# Patient Record
Sex: MALE | Race: WHITE | NOT HISPANIC OR LATINO | Employment: UNEMPLOYED | URBAN - NONMETROPOLITAN AREA
[De-identification: names, ages, dates, MRNs, and addresses within clinical notes are randomized per-mention and may not be internally consistent; named-entity substitution may affect disease eponyms.]

---

## 2017-01-04 ENCOUNTER — HOSPITAL ENCOUNTER (OUTPATIENT)
Dept: PHYSICAL THERAPY | Facility: HOSPITAL | Age: 47
Setting detail: THERAPIES SERIES
End: 2017-01-04
Attending: FAMILY MEDICINE
Payer: OTHER MISCELLANEOUS

## 2017-01-04 PROCEDURE — 40000718 ZZHC STATISTIC PT DEPARTMENT ORTHO VISIT: Performed by: PHYSICAL THERAPIST

## 2017-01-04 PROCEDURE — 97112 NEUROMUSCULAR REEDUCATION: CPT | Mod: GP | Performed by: PHYSICAL THERAPIST

## 2017-01-04 PROCEDURE — 97110 THERAPEUTIC EXERCISES: CPT | Mod: GP | Performed by: PHYSICAL THERAPIST

## 2017-01-06 ENCOUNTER — HOSPITAL ENCOUNTER (OUTPATIENT)
Dept: PHYSICAL THERAPY | Facility: HOSPITAL | Age: 47
Setting detail: THERAPIES SERIES
End: 2017-01-06
Attending: FAMILY MEDICINE
Payer: OTHER MISCELLANEOUS

## 2017-01-06 PROCEDURE — 97110 THERAPEUTIC EXERCISES: CPT | Mod: GP | Performed by: PHYSICAL THERAPIST

## 2017-01-06 PROCEDURE — 97112 NEUROMUSCULAR REEDUCATION: CPT | Mod: GP | Performed by: PHYSICAL THERAPIST

## 2017-01-06 PROCEDURE — 40000718 ZZHC STATISTIC PT DEPARTMENT ORTHO VISIT: Performed by: PHYSICAL THERAPIST

## 2017-01-09 ENCOUNTER — OFFICE VISIT (OUTPATIENT)
Dept: FAMILY MEDICINE | Facility: OTHER | Age: 47
End: 2017-01-09
Attending: FAMILY MEDICINE
Payer: OTHER MISCELLANEOUS

## 2017-01-09 ENCOUNTER — HOSPITAL ENCOUNTER (OUTPATIENT)
Dept: PHYSICAL THERAPY | Facility: HOSPITAL | Age: 47
Setting detail: THERAPIES SERIES
End: 2017-01-09
Attending: FAMILY MEDICINE
Payer: OTHER MISCELLANEOUS

## 2017-01-09 VITALS
SYSTOLIC BLOOD PRESSURE: 122 MMHG | BODY MASS INDEX: 36.06 KG/M2 | WEIGHT: 281 LBS | RESPIRATION RATE: 17 BRPM | HEART RATE: 68 BPM | DIASTOLIC BLOOD PRESSURE: 72 MMHG | TEMPERATURE: 96 F

## 2017-01-09 DIAGNOSIS — G89.4 CHRONIC PAIN SYNDROME: ICD-10-CM

## 2017-01-09 DIAGNOSIS — M51.26 DISPLACEMENT OF LUMBAR INTERVERTEBRAL DISC WITHOUT MYELOPATHY: Primary | ICD-10-CM

## 2017-01-09 PROCEDURE — 99213 OFFICE O/P EST LOW 20 MIN: CPT | Performed by: FAMILY MEDICINE

## 2017-01-09 PROCEDURE — 97110 THERAPEUTIC EXERCISES: CPT | Mod: GP | Performed by: PHYSICAL THERAPIST

## 2017-01-09 PROCEDURE — 40000718 ZZHC STATISTIC PT DEPARTMENT ORTHO VISIT: Performed by: PHYSICAL THERAPIST

## 2017-01-09 ASSESSMENT — PAIN SCALES - GENERAL: PAINLEVEL: MODERATE PAIN (4)

## 2017-01-09 NOTE — PROGRESS NOTES
Outpatient Physical Therapy Progress Note     Patient: Valeriy Webb Jr  : 1970    Beginning/End Dates of Reporting Period:  16 to 2017    Referring Provider: Dr. Basurto    Therapy Diagnosis: chronic back pain     Client Self Report: Pt seen from 10:05-11:00.  Pt states he had increased tightness and pain in low back after last sessions, feels it's related to foam roller stretch but states it has improved.  Pt states he slept better last night and states that he did work on relaxation breathing prior to going to sleep last night.  Pt reports he has continued to do more activities for himself as he understands more than he will not hurt himself further with certain activities.    Objective Measurements:      Outcome Measures (most recent score):      Goals:  Goal Identifier LTG 1   Goal Description Sleep more than 4 hours per night.   Target Date 17   Date Met      Progress:     Goal Identifier LTG 2   Goal Description Able to tolerate community distance amb with pain < 3/10.   Target Date 17   Date Met      Progress:     Goal Identifier LTG 3   Goal Description Complete ADL's and household activity with back pain < 3/10.   Target Date 17   Date Met      Progress:     Goal Identifier     Goal Description     Target Date     Date Met      Progress:     Goal Identifier     Goal Description     Target Date     Date Met      Progress:     Goal Identifier     Goal Description     Target Date     Date Met      Progress:     Goal Identifier     Goal Description     Target Date     Date Met      Progress:     Goal Identifier     Goal Description     Target Date     Date Met      Progress:     Progress Toward Goals:   Progress this reporting period: Pt is demonstrating improvements in activity tolerance and aerobic activity participation, tolerating stationary bike x20 minutes.  Pt doing well with HEP of upper body theraband exercises to improve aerobic activity as well.  Pt has been  provided with education sessions regarding chronic pain and reports improvement with this education stating that he is doing more activities for himself that he would have formerly asked family to do because he was fearful of moving.  Pt has had some mild improvement in sleeping as well as of today's session.  Pt does continue to demonstrates overall limited activity tolerance, tightness in thoracic and lumbar spine, tightness in LEs, poor awareness of core and spinal musculature.  Pt would benefit from continued PT sessions to continue with chronic pain education as well as continue to progress aerobic activity, stretching, core stabilization, and manual therapy as needed.          Plan:  Continue therapy per current plan of care.  Would like to continue PT from 1/9/2017 to 3/6/2017 for an additional 16 sessions.      Discharge:  No    I certify the need for these services furnished under this plan of treatment and while under my care. (Physician co-signature of this document indicates review and certification of the therapy plan).
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
## 2017-01-09 NOTE — NURSING NOTE
"Chief Complaint   Patient presents with     Work Comp       Initial /72 mmHg  Pulse 68  Temp(Src) 96  F (35.6  C)  Resp 17  Wt 281 lb (127.461 kg) Estimated body mass index is 36.06 kg/(m^2) as calculated from the following:    Height as of 5/5/16: 6' 2\" (1.88 m).    Weight as of this encounter: 281 lb (127.461 kg).  BP completed using cuff size: large  "

## 2017-01-18 ENCOUNTER — HOSPITAL ENCOUNTER (OUTPATIENT)
Dept: PHYSICAL THERAPY | Facility: HOSPITAL | Age: 47
Setting detail: THERAPIES SERIES
End: 2017-01-18
Attending: FAMILY MEDICINE
Payer: OTHER MISCELLANEOUS

## 2017-01-18 PROCEDURE — 97110 THERAPEUTIC EXERCISES: CPT | Mod: GP | Performed by: PHYSICAL THERAPIST

## 2017-01-18 PROCEDURE — 40000718 ZZHC STATISTIC PT DEPARTMENT ORTHO VISIT: Performed by: PHYSICAL THERAPIST

## 2017-01-25 ENCOUNTER — HOSPITAL ENCOUNTER (OUTPATIENT)
Dept: PHYSICAL THERAPY | Facility: HOSPITAL | Age: 47
Setting detail: THERAPIES SERIES
End: 2017-01-25
Attending: FAMILY MEDICINE
Payer: OTHER MISCELLANEOUS

## 2017-01-25 PROCEDURE — 97110 THERAPEUTIC EXERCISES: CPT | Mod: GP | Performed by: PHYSICAL THERAPIST

## 2017-01-25 PROCEDURE — 40000718 ZZHC STATISTIC PT DEPARTMENT ORTHO VISIT: Performed by: PHYSICAL THERAPIST

## 2017-02-15 ENCOUNTER — HOSPITAL ENCOUNTER (OUTPATIENT)
Dept: PHYSICAL THERAPY | Facility: HOSPITAL | Age: 47
Setting detail: THERAPIES SERIES
End: 2017-02-15
Attending: FAMILY MEDICINE
Payer: OTHER MISCELLANEOUS

## 2017-02-15 PROCEDURE — 40000718 ZZHC STATISTIC PT DEPARTMENT ORTHO VISIT: Performed by: PHYSICAL THERAPIST

## 2017-02-15 PROCEDURE — 97110 THERAPEUTIC EXERCISES: CPT | Mod: GP | Performed by: PHYSICAL THERAPIST

## 2017-02-22 ENCOUNTER — HOSPITAL ENCOUNTER (OUTPATIENT)
Dept: PHYSICAL THERAPY | Facility: HOSPITAL | Age: 47
Setting detail: THERAPIES SERIES
End: 2017-02-22
Attending: FAMILY MEDICINE
Payer: OTHER MISCELLANEOUS

## 2017-02-22 PROCEDURE — 97110 THERAPEUTIC EXERCISES: CPT | Mod: GP | Performed by: PHYSICAL THERAPIST

## 2017-02-22 PROCEDURE — 40000718 ZZHC STATISTIC PT DEPARTMENT ORTHO VISIT: Performed by: PHYSICAL THERAPIST

## 2017-03-01 ENCOUNTER — HOSPITAL ENCOUNTER (OUTPATIENT)
Dept: PHYSICAL THERAPY | Facility: HOSPITAL | Age: 47
Setting detail: THERAPIES SERIES
End: 2017-03-01
Attending: FAMILY MEDICINE
Payer: OTHER MISCELLANEOUS

## 2017-03-01 PROCEDURE — 97110 THERAPEUTIC EXERCISES: CPT | Mod: GP | Performed by: PHYSICAL THERAPIST

## 2017-03-01 PROCEDURE — 40000718 ZZHC STATISTIC PT DEPARTMENT ORTHO VISIT: Performed by: PHYSICAL THERAPIST

## 2017-03-06 ENCOUNTER — TELEPHONE (OUTPATIENT)
Dept: FAMILY MEDICINE | Facility: OTHER | Age: 47
End: 2017-03-06

## 2017-03-06 DIAGNOSIS — M51.26 DISPLACEMENT OF LUMBAR INTERVERTEBRAL DISC WITHOUT MYELOPATHY: Primary | ICD-10-CM
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
## 2017-03-15 ENCOUNTER — HOSPITAL ENCOUNTER (OUTPATIENT)
Dept: PHYSICAL THERAPY | Facility: HOSPITAL | Age: 47
Setting detail: THERAPIES SERIES
End: 2017-03-15
Attending: FAMILY MEDICINE
Payer: OTHER MISCELLANEOUS

## 2017-03-15 PROCEDURE — 40000718 ZZHC STATISTIC PT DEPARTMENT ORTHO VISIT: Performed by: PHYSICAL THERAPIST

## 2017-03-15 PROCEDURE — 97110 THERAPEUTIC EXERCISES: CPT | Mod: GP | Performed by: PHYSICAL THERAPIST

## 2017-03-22 ENCOUNTER — HOSPITAL ENCOUNTER (OUTPATIENT)
Dept: PHYSICAL THERAPY | Facility: HOSPITAL | Age: 47
Setting detail: THERAPIES SERIES
End: 2017-03-22
Attending: FAMILY MEDICINE
Payer: OTHER MISCELLANEOUS

## 2017-03-22 PROCEDURE — 40000718 ZZHC STATISTIC PT DEPARTMENT ORTHO VISIT: Performed by: PHYSICAL THERAPIST

## 2017-03-22 PROCEDURE — 97110 THERAPEUTIC EXERCISES: CPT | Mod: GP | Performed by: PHYSICAL THERAPIST

## 2017-03-29 ENCOUNTER — HOSPITAL ENCOUNTER (OUTPATIENT)
Dept: PHYSICAL THERAPY | Facility: HOSPITAL | Age: 47
Setting detail: THERAPIES SERIES
End: 2017-03-29
Attending: FAMILY MEDICINE
Payer: OTHER MISCELLANEOUS

## 2017-03-29 PROCEDURE — 40000718 ZZHC STATISTIC PT DEPARTMENT ORTHO VISIT: Performed by: PHYSICAL THERAPIST

## 2017-03-29 PROCEDURE — 97110 THERAPEUTIC EXERCISES: CPT | Mod: GP | Performed by: PHYSICAL THERAPIST

## 2017-04-05 ENCOUNTER — HOSPITAL ENCOUNTER (OUTPATIENT)
Dept: PHYSICAL THERAPY | Facility: HOSPITAL | Age: 47
Setting detail: THERAPIES SERIES
End: 2017-04-05
Attending: FAMILY MEDICINE
Payer: OTHER MISCELLANEOUS

## 2017-04-05 PROCEDURE — 97110 THERAPEUTIC EXERCISES: CPT | Mod: GP | Performed by: PHYSICAL THERAPIST

## 2017-04-05 PROCEDURE — 40000718 ZZHC STATISTIC PT DEPARTMENT ORTHO VISIT: Performed by: PHYSICAL THERAPIST

## 2017-04-11 ENCOUNTER — HOSPITAL ENCOUNTER (OUTPATIENT)
Dept: PHYSICAL THERAPY | Facility: HOSPITAL | Age: 47
Setting detail: THERAPIES SERIES
End: 2017-04-11
Attending: FAMILY MEDICINE
Payer: OTHER MISCELLANEOUS

## 2017-04-11 PROCEDURE — 97110 THERAPEUTIC EXERCISES: CPT | Mod: GP | Performed by: PHYSICAL THERAPIST

## 2017-04-11 PROCEDURE — 40000718 ZZHC STATISTIC PT DEPARTMENT ORTHO VISIT: Performed by: PHYSICAL THERAPIST

## 2017-04-12 NOTE — PROGRESS NOTES
Outpatient Physical Therapy Discharge Note     Patient: Valeriy Webb Jr  : 1970    Beginning/End Dates of Reporting Period:  2016 to 2017    Referring Provider: Dr. Basurto    Therapy Diagnosis: chronic back pain     Client Self Report: Pt reports things have been pretty good since last visit.  Continues to work on HEP and walk both on treadmill and outside.  Pt looking forward to getting started at gym.    Objective Measurements:      Outcome Measures (most recent score):      Goals:  Goal Identifier LTG 1   Goal Description Sleep more than 4 hours per night.   Target Date 17   Date Met  17   Progress:     Goal Identifier LTG 2   Goal Description Able to tolerate community distance amb with pain < 3/10.   Target Date 17   Date Met  17   Progress:     Goal Identifier LTG 3   Goal Description Complete ADL's and household activity with back pain < 3/10.   Target Date 17   Date Met  17   Progress:     Goal Identifier     Goal Description     Target Date     Date Met      Progress:     Goal Identifier     Goal Description     Target Date     Date Met      Progress:     Goal Identifier     Goal Description     Target Date     Date Met      Progress:     Goal Identifier     Goal Description     Target Date     Date Met      Progress:     Goal Identifier     Goal Description     Target Date     Date Met      Progress:     Progress Toward Goals:   Progress this reporting period: Pt has made good gains in his overall activity level and participation in household activities.  Pt reports he is doing a lot more such as raking, cleaning, etc that he was not able to do 5 months ago.  Pt verbalizes a significant benefit in the education and knowledge he recieved regarding pain science education.  Pt ready to make transition to independence at local gym.          Plan:  Discharge from therapy.    Discharge:    Reason for Discharge: Patient has met all goals.    Equipment  Issued:     Discharge Plan: Patient to continue home program.

## 2017-09-13 ENCOUNTER — TELEPHONE (OUTPATIENT)
Dept: FAMILY MEDICINE | Facility: OTHER | Age: 47
End: 2017-09-13

## 2017-09-13 NOTE — TELEPHONE ENCOUNTER
4:44 PM    Reason for Call: OVERBOOK    Patient is having the following symptoms: lumps under arms not going away and doing funny things and something on toe for 2 months.    The patient is requesting an appointment for this week or early next with Dr Basurto.    Was an appointment offered for this call? Yes  If yes : Appointment type Short              Date 09/29/17    Preferred method for responding to this message: Telephone Call  What is your phone number ?[phone]    If we cannot reach you directly, may we leave a detailed response at the number you provided? Yes    Can this message wait until your PCP/provider returns, if unavailable today?     Tamy Ruiz

## 2017-09-15 ENCOUNTER — OFFICE VISIT (OUTPATIENT)
Dept: FAMILY MEDICINE | Facility: OTHER | Age: 47
End: 2017-09-15
Attending: FAMILY MEDICINE
Payer: COMMERCIAL

## 2017-09-15 VITALS
DIASTOLIC BLOOD PRESSURE: 74 MMHG | HEIGHT: 74 IN | SYSTOLIC BLOOD PRESSURE: 122 MMHG | TEMPERATURE: 96.9 F | WEIGHT: 284 LBS | HEART RATE: 70 BPM | BODY MASS INDEX: 36.45 KG/M2 | OXYGEN SATURATION: 97 %

## 2017-09-15 DIAGNOSIS — L98.8 SKIN MACERATION: ICD-10-CM

## 2017-09-15 DIAGNOSIS — Z72.0 TOBACCO ABUSE: ICD-10-CM

## 2017-09-15 DIAGNOSIS — L02.92 FURUNCLE OF SKIN OR SUBCUTANEOUS TISSUE: Primary | ICD-10-CM

## 2017-09-15 DIAGNOSIS — Z71.6 TOBACCO ABUSE COUNSELING: ICD-10-CM

## 2017-09-15 PROCEDURE — 99213 OFFICE O/P EST LOW 20 MIN: CPT | Performed by: FAMILY MEDICINE

## 2017-09-15 ASSESSMENT — ANXIETY QUESTIONNAIRES
7. FEELING AFRAID AS IF SOMETHING AWFUL MIGHT HAPPEN: MORE THAN HALF THE DAYS
GAD7 TOTAL SCORE: 14
6. BECOMING EASILY ANNOYED OR IRRITABLE: MORE THAN HALF THE DAYS
4. TROUBLE RELAXING: NEARLY EVERY DAY
3. WORRYING TOO MUCH ABOUT DIFFERENT THINGS: MORE THAN HALF THE DAYS
2. NOT BEING ABLE TO STOP OR CONTROL WORRYING: MORE THAN HALF THE DAYS
IF YOU CHECKED OFF ANY PROBLEMS ON THIS QUESTIONNAIRE, HOW DIFFICULT HAVE THESE PROBLEMS MADE IT FOR YOU TO DO YOUR WORK, TAKE CARE OF THINGS AT HOME, OR GET ALONG WITH OTHER PEOPLE: SOMEWHAT DIFFICULT
1. FEELING NERVOUS, ANXIOUS, OR ON EDGE: MORE THAN HALF THE DAYS
5. BEING SO RESTLESS THAT IT IS HARD TO SIT STILL: SEVERAL DAYS

## 2017-09-15 ASSESSMENT — PATIENT HEALTH QUESTIONNAIRE - PHQ9: SUM OF ALL RESPONSES TO PHQ QUESTIONS 1-9: 11

## 2017-09-15 ASSESSMENT — PAIN SCALES - GENERAL: PAINLEVEL: MODERATE PAIN (4)

## 2017-09-15 NOTE — MR AVS SNAPSHOT
After Visit Summary   9/15/2017    Valeriy Webb Jr    MRN: [id number]           Patient Information     Date Of Birth          1970        Visit Information        Provider Department      9/15/2017 10:30 AM Arben Basurto MD [facility] [place]        Today's Diagnoses     Furuncle of skin or subcutaneous tissue    -  1    Tobacco abuse        Tobacco abuse counseling        Skin maceration          Care Instructions      HOW TO QUIT SMOKING  Smoking is one of the hardest habits to break. About half of all those who have ever smoked have been able to quit, and most of those (about 70%) who still smoke want to quit. Here are some of the best ways to stop smoking.     KEEP TRYING:  It takes most smokers about 8 tries before they are finally able to fully quit. So, the more often you try and fail, the better your chance of quitting the next time! So, don't give up!    GO COLD TURKEY:  Most ex-smokers quit cold turkey. Trying to cut back gradually doesn't seem to work as well, perhaps because it continues the smoking habit. Also, it is possible to fool yourself by inhaling more while smoking fewer cigarettes. This results in the same amount of nicotine in your body!    GET SUPPORT:  Support programs can make an important difference, especially for the heavy smoker. These groups offer lectures, methods to change your behavior and peer support. Call the free national Quitline for more information. 800-QUIT-NOW (986-869-8843). Low-cost or free programs are offered by many hospitals, local chapters of the American Lung Association (871-934-0605) and the American Cancer Society (509-824-8454). Support at home is important too. Non-smokers can help by offering praise and encouragement. If the smoker fails to quit, encourage them to try again!    OVER-THE-COUNTER MEDICINES:  For those who can't quit on their own, Nicotine Replacement Therapy (NRT) may make quitting much easier. Certain aids such  as the nicotine patch, gum and lozenge are available without a prescription. However, it is best to use these under the guidance of your doctor. The skin patch provides a steady supply of nicotine to the body. Nicotine gum and lozenge gives temporary bursts of low levels of nicotine. Both methods take the edge off the craving for cigarettes. WARNING: If you feel symptoms of nicotine overdose, such as nausea, vomiting, dizziness, weakness, or fast heartbeat, stop using these and see your doctor.    PRESCRIPTION MEDICINES:  After evaluating your smoking patterns and prior attempts at quitting, your doctor may offer a prescription medicine such as bupropion (Zyban, Wellbutrin), varenicline (Chantix, Champix), a niocotine inhaler or nasal spray. Each has its unique advantage and side effects which your doctor can review with you.    HEALTH BENEFITS OF QUITTING:  The benefits of quitting start right away and keep improving the longer you go without smokin minutes: blood pressure and pulse return to normal  8 hours: oxygen levels return to normal  2 days: ability to smell and taste begins to improve as damaged nerves start to regrow  2-3 weeks: circulation and lung function improves  1-9 months: decreased cough, congestion and shortness of breath; less tired  1 year: risk of heart attack decreases by half  5 years: risk of lung cancer decreases by half; risk of stroke becomes the same as a non-smoker  For information about how to quit smoking, visit the following links:  National Cancer [place] ,   Clearing the Air, Quit Smoking Today   - an online booklet. http://www.smokefree.gov/pubs/clearing_the_air.pdf  Smokefree.gov http://smokefree.gov/  QuitNet http://www.quitnet.com/    8426-5875 Amber Hahn, [address] [place] [address], [place] [address]. All rights reserved. This information is not intended as a substitute for professional medical care. Always follow your healthcare professional's instructions.    The  Benefits of Living Smoke Free  What do you want to gain from quitting? Check off some reasons to quit.  Health Benefits  ___ Reduce my risk of lung cancer, heart disease, chronic lung disease  ___ Have fewer wrinkles and softer skin  ___ Improve my sense of taste and smell  ___ For pregnant women--reduce the risk of having a miscarriage, stillbirth, premature birth, or low-birth-weight baby  Personal Benefits  ___ Feel more in control of my life  ___ Have better-smelling hair, breath, clothes, home, and car  ___ Save time by not having to take smoke breaks, buy cigarettes, or hunt for a light  ___ Have whiter teeth  Family Benefits  ___ Reduce my children s respiratory tract infections  ___ Set a good example for my children  ___ Reduce my family s cancer risk  Financial Benefits  ___ Save hundreds of dollars each year that would be spent on cigarettes  ___ Save money on medical bills  ___ Save on life, health, and car insurance premiums    Those Dollars Add Up!  Cigarettes are expensive, and getting more expensive all the time. Do you realize how much money you are spending on cigarettes per year? What is the average amount you spend on a pack of cigarettes? What is the average number of packs that you smoke per day? Using your answers to these questions, fill in this formula to help you find out:  ($ _____ per pack) ×  ( _____ number of packs per day) × (365 days) =  $ _____ yearly cost of smoking  Besides tobacco, there are other costs, including extra cleaning bills and replacement costs for clothing and furniture; medical expenses for smoking-related illnesses; and higher health, life, and car insurance premiums.    Cigars and Pipes Count Too!  Cigars and pipes are also dangerous. So are smokeless (chewing) tobacco and snuff. All of these products contain nicotine, a highly addictive substance that has harmful effects on your body. Quitting smoking means giving up all tobacco products.      3678-1949 Amber  "[place], [address] [place] [address]. All rights reserved. This information is not intended as a substitute for professional medical care. Always follow your healthcare professional's instructions.          Follow-ups after your visit        Who to contact     If you have questions or need follow up information about today's clinic visit or your schedule please contact [facility] directly at [phone].  Normal or non-critical lab and imaging results will be communicated to you by MyChart, letter or phone within 4 business days after the clinic has received the results. If you do not hear from us within 7 days, please contact the clinic through [employer] or phone. If you have a critical or abnormal lab result, we will notify you by phone as soon as possible.  Submit refill requests through [employer] or call your pharmacy and they will forward the refill request to us. Please allow 3 business days for your refill to be completed.          Additional Information About Your Visit        [employer] Information     [employer] lets you send messages to your doctor, view your test results, renew your prescriptions, schedule appointments and more. To sign up, go to [email or web address] . Click on \"Log in\" on the left side of the screen, which will take you to the Welcome page. Then click on \"Sign up Now\" on the right side of the page.     You will be asked to enter the access code listed below, as well as some personal information. Please follow the directions to create your username and password.     Your access code is: [email or web address]  Expires: 2017 11:22 AM     Your access code will  in 90 days. If you need help or a new code, please call your [facility] [place] or [phone].        Care EveryWhere ID     This is your Care EveryWhere ID. This could be used by other organizations to access your [place] medical records  [id number]        Your Vitals Were     Pulse Temperature Height " "Pulse Oximetry BMI (Body Mass Index)       70 96.9  F (36.1  C) (Tympanic) 6' 2\" (1.88 m) 97% 36.46 kg/m2        Blood Pressure from Last 3 Encounters:   09/15/17 122/74   01/09/17 122/72   12/06/16 122/72    Weight from Last 3 Encounters:   09/15/17 284 lb (128.8 kg)   01/09/17 281 lb (127.5 kg)   12/06/16 278 lb (126.1 kg)              We Performed the Following     Tobacco Cessation - for Health Maintenance        Primary Care Provider Office Phone # Fax #    Arben Basurto [phone]       [facility] [address] [facility] [address]        Equal Access to Services     DORIS BARAHONA : Hadii eden sorto hadricho Sodavon, waaxda luqadaha, qaybta kaalmada adeegyada, archana wheat . So [facility] [phone].    ATENCIÓN: Si habla español, tiene a ayala disposición servicios gratuitos de asistencia lingüística. Llangelica al [phone].    We comply with applicable federal civil rights laws and [place] laws. We do not discriminate on the basis of race, color, national origin, age, disability sex, sexual orientation or gender identity.            Thank you!     Thank you for choosing [facility]  for your care. Our goal is always to provide you with excellent care. Hearing back from our patients is one way we can continue to improve our services. Please take a few minutes to complete the written survey that you may receive in the mail after your visit with us. Thank you!             Your Updated Medication List - Protect others around you: Learn how to safely use, store and throw away your medicines at www.disposemymeds.org.          This list is accurate as of: 9/15/17 11:22 AM.  Always use your most recent med list.                   Brand Name Dispense Instructions for use Diagnosis    aspirin 81 MG chewable tablet     108 tablet    Take 1 tablet (81 mg) by mouth daily    FH: CAD (coronary artery disease)       cyclobenzaprine 10 MG tablet    FLEXERIL    90 " tablet    Take 1 tablet (10 mg) by mouth 3 times daily    DDD (degenerative disc disease), lumbar       ibuprofen 800 MG tablet    ADVIL/MOTRIN    90 tablet    Take 1 tablet (800 mg) by mouth nightly as needed    Displacement of lumbar intervertebral disc without myelopathy, Chronic pain syndrome       MULTIVITAMIN PO       Fatigue, Low serum testosterone level, FH: CAD (coronary artery disease)       vitamin D3 2000 UNITS Caps      Take 2 capsules by mouth daily.

## 2017-09-15 NOTE — PATIENT INSTRUCTIONS

## 2017-09-15 NOTE — PROGRESS NOTES
"  SUBJECTIVE:   Valeriy Webb Jr is a 47 year old male who presents to clinic today for the following health issues:      Lump      Duration: 2 months    Description (location/character/radiation): Right armpit has lumps. They swell up, sometimes go away but then comes back    Intensity:  mild    Accompanying signs and symptoms: none    History (similar episodes/previous evaluation): None    Precipitating or alleviating factors: None    Therapies tried and outcome: does drain them, puts warm wash cloth on them as well    Drained last night    Lumps are tender sometimes    Lumps come and go - swell and resolve.     Right axilla not both         Sore      Duration: 2 years    Description (location/character/radiation): Right foot, sore between pinky toe. Gets worse in the summer time.    Intensity:  mild    Accompanying signs and symptoms: none    History (similar episodes/previous evaluation): None    Precipitating or alleviating factors: None    Therapies tried and outcome: None    Tries to clean it     Better after summer goes    Worse with sweating and walking          Problem list and histories reviewed & adjusted, as indicated.  Additional history:         [facility]  Pro[facility]       Reviewed and updated as needed this visit by Provider         ROS:  C: NEGATIVE for fever, chills, change in weight  R: NEGATIVE for significant cough or SOB  CV: NEGATIVE for chest pain, palpitations or peripheral edema    OBJECTIVE:                                                    /74 (BP Location: Right arm, Patient Position: Chair, Cuff Size: Adult Large)  Pulse 70  Temp 96.9  F (36.1  C) (Tympanic)  Ht 6' 2\" (1.88 m)  Wt 284 lb (128.8 kg)  SpO2 97%  BMI 36.46 kg/m2  Body mass index is 36.46 kg/(m^2).   GENERAL: healthy, alert, well nourished, well hydrated, no distress  HENT: ear canals- normal; TMs- normal; Nose- normal; Mouth- no ulcers, no " lesions  NECK: no tenderness, no adenopathy, no asymmetry, no masses, no stiffness; thyroid- normal to palpation  RESP: lungs clear to auscultation - no rales, no rhonchi, no wheezes  MS: extremities- rigth foot - mild maceration of 4th and 5th toe no gross deformities noted, no edema  SKIN: right axilla- resolving furnacle  No LN found in axilla or groin etc.          ASSESSMENT/PLAN:                                                    (L02.92) Furuncle of skin or subcutaneous tissue  (primary encounter diagnosis)  Comment: *resolving but recurrent   Plan: discussed. Good antibacterial soap. Some hibeclens all discussed. Heat. Hands off. Symptomatic treatment was discussed along when patient should call and/or come back into the clinic or go to ER/Urgent care. All questions answered.     (Z72.0) Tobacco abuse  Comment: discussed - not want to quit   Plan: Tobacco Cessation - for Health Maintenance              (Z71.6) Tobacco abuse counseling  Comment: chews   Plan: Not interested in quiting     (L98.8) Skin maceration  Comment: discussed   Plan: Symptomatic treatment was discussed along when patient should call and/or come back into the clinic or go to ER/Urgent care. All questions answered.         See Patient Instructions    Arben Basurto MD  [facility] (formerly [facility])[3]

## 2017-09-15 NOTE — NURSING NOTE
"Chief Complaint   Patient presents with     Mass     Foot Problems       Initial /74 (BP Location: Right arm, Patient Position: Chair, Cuff Size: Adult Large)  Pulse 70  Temp 96.9  F (36.1  C) (Tympanic)  Ht 6' 2\" (1.88 m)  Wt 284 lb (128.8 kg)  SpO2 97%  BMI 36.46 kg/m2 Estimated body mass index is 36.46 kg/(m^2) as calculated from the following:    Height as of this encounter: 6' 2\" (1.88 m).    Weight as of this encounter: 284 lb (128.8 kg).  Medication Reconciliation: complete     Susanne Padilla     "

## 2017-09-16 ASSESSMENT — ANXIETY QUESTIONNAIRES: GAD7 TOTAL SCORE: 14

## 2018-09-10 ENCOUNTER — TELEPHONE (OUTPATIENT)
Dept: FAMILY MEDICINE | Facility: OTHER | Age: 48
End: 2018-09-10

## 2018-09-10 NOTE — TELEPHONE ENCOUNTER
2:39 PM    Reason for Call: OVERBOOK    Patient is having the following symptoms: heart pulpitations for 2 weeks.    The patient is requesting an appointment for 09/11/2018 with Dr Basurto.    Was an appointment offered for this call? Yes  If yes : Appointment type              Date    Preferred method for responding to this message: Telephone Call  What is your phone number ?    If we cannot reach you directly, may we leave a detailed response at the number you provided? Yes    Can this message wait until your PCP/provider returns, if unavailable today? No,     Alcira Hernández

## 2018-09-11 NOTE — PROGRESS NOTES
"  SUBJECTIVE:   Valeriy Webb Jr is a 48 year old male who presents to clinic today for the following health issues:      Heart palpitations       Onset: several months ago    Description (location/character/radiation/duration): started with pounding in his neck when he stood up, then he had dizziness when standing, then he started having heart palpatations    Intensity:  Mild 0/10    Accompanying signs and symptoms:        Shortness of breath: no        Sweating: YES ( hot flashes)       Nausea/vomitting: YES once in awhile       Palpitations: YES       Other (fevers/chills/cough/heartburn/lightheadedness): YES, heartburn    History (similar episodes/previous evaluation): None    Precipitating or alleviating factors:       Worse with exertion: no        Worse with breathing: no        Related to eating: no        Better with burping: no     Therapies tried and outcome: None    Works on line and sits a lot    Pt notices on standing would get throbbing behind head    Now does not get throbbing but gets dizziness for 20-30 sec    This is better but now has irregular heart beats now rest or positional changes.   No meds  No stimulants   No sx with walking an hour a day  Can not loose wt and wt up some  Off all Rx med  No drugs etc  Chews daily           Problem list and histories reviewed & adjusted, as indicated.  Additional history: as documented    Labs reviewed in EPIC    Reviewed and updated as needed this visit by clinical staff       Reviewed and updated as needed this visit by Provider         ROS:  CONSTITUTIONAL: NEGATIVE for fever, chills, change in weight  RESP: NEGATIVE for significant cough or SOB  CV: NEGATIVE for chest pain, palpitations or peripheral edema    OBJECTIVE:                                                    /82  Pulse 82  Temp 96.3  F (35.7  C) (Tympanic)  Resp 18  Ht 6' 2\" (1.88 m)  Wt 294 lb (133.4 kg)  SpO2 96%  BMI 37.75 kg/m2  Body mass index is 37.75 kg/(m^2). "   GENERAL: healthy, alert, well nourished, well hydrated, no distress  HENT: ear canals- normal; TMs- normal; Nose- normal; Mouth- no ulcers, no lesions  NECK: no tenderness, no adenopathy, no asymmetry, no masses, no stiffness; thyroid- normal to palpation  RESP: lungs clear to auscultation - no rales, no rhonchi, no wheezes  CV: regular rates and rhythm, normal S1 S2, no S3 or S4 and no murmur, no click or rub -  ABDOMEN: soft, no tenderness, no  hepatosplenomegaly, no masses, normal bowel sounds  MS: extremities- no gross deformities noted, no edema    Results for orders placed or performed in visit on 09/12/18 (from the past 24 hour(s))   Comprehensive metabolic panel   Result Value Ref Range    Sodium 137 133 - 144 mmol/L    Potassium 3.9 3.4 - 5.3 mmol/L    Chloride 103 94 - 109 mmol/L    Carbon Dioxide 30 20 - 32 mmol/L    Anion Gap 4 3 - 14 mmol/L    Glucose 103 (H) 70 - 99 mg/dL    Urea Nitrogen 17 7 - 30 mg/dL    Creatinine 1.04 0.66 - 1.25 mg/dL    GFR Estimate 76 >60 mL/min/1.7m2    GFR Estimate If Black >90 >60 mL/min/1.7m2    Calcium 8.5 8.5 - 10.1 mg/dL    Bilirubin Total 0.4 0.2 - 1.3 mg/dL    Albumin 3.8 3.4 - 5.0 g/dL    Protein Total 7.4 6.8 - 8.8 g/dL    Alkaline Phosphatase 101 40 - 150 U/L    ALT 94 (H) 0 - 70 U/L    AST 56 (H) 0 - 45 U/L   CBC with platelets differential   Result Value Ref Range    WBC 6.3 4.0 - 11.0 10e9/L    RBC Count 4.76 4.4 - 5.9 10e12/L    Hemoglobin 15.0 13.3 - 17.7 g/dL    Hematocrit 43.4 40.0 - 53.0 %    MCV 91 78 - 100 fl    MCH 31.5 26.5 - 33.0 pg    MCHC 34.6 31.5 - 36.5 g/dL    RDW 12.8 10.0 - 15.0 %    Platelet Count 228 150 - 450 10e9/L    Diff Method Automated Method     % Neutrophils 64.4 %    % Lymphocytes 25.5 %    % Monocytes 6.1 %    % Eosinophils 3.3 %    % Basophils 0.5 %    % Immature Granulocytes 0.2 %    Nucleated RBCs 0 0 /100    Absolute Neutrophil 4.1 1.6 - 8.3 10e9/L    Absolute Lymphocytes 1.6 0.8 - 5.3 10e9/L    Absolute Monocytes 0.4 0.0 - 1.3  10e9/L    Absolute Eosinophils 0.2 0.0 - 0.7 10e9/L    Absolute Basophils 0.0 0.0 - 0.2 10e9/L    Abs Immature Granulocytes 0.0 0 - 0.4 10e9/L    Absolute Nucleated RBC 0.0    TSH   Result Value Ref Range    TSH 3.32 0.40 - 4.00 mU/L     EKG unremarkable      ASSESSMENT/PLAN:                                                        ICD-10-CM    1. Heart palpitations R00.2 Comprehensive metabolic panel     CBC with platelets differential     TSH     EKG 12-lead complete w/read - Clinics     Lipid Profile   2. FH: CAD (coronary artery disease) Z82.49 Lipid Profile     Discussed at length- probable PVC's. Does not sound like anginal sx. 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 Discussed moving every hour.  Discussed checking Ziopatch. After long discussion. Pt wants to wait and watch.  Symptomatic treatment was discussed along when patient should call and/or come back into the clinic or go to ER/Urgent care. All questions answered.   LFT mild up - has fatty liver- US done in remote past. Fasting lipids done today and pending     See Patient Instructions    Arben Basurto MD  [facility]

## 2018-09-12 ENCOUNTER — OFFICE VISIT (OUTPATIENT)
Dept: FAMILY MEDICINE | Facility: OTHER | Age: 48
End: 2018-09-12
Attending: FAMILY MEDICINE
Payer: COMMERCIAL

## 2018-09-12 VITALS
TEMPERATURE: 96.3 F | DIASTOLIC BLOOD PRESSURE: 82 MMHG | RESPIRATION RATE: 18 BRPM | OXYGEN SATURATION: 96 % | HEIGHT: 74 IN | WEIGHT: 294 LBS | SYSTOLIC BLOOD PRESSURE: 120 MMHG | HEART RATE: 82 BPM | BODY MASS INDEX: 37.73 KG/M2

## 2018-09-12 DIAGNOSIS — R00.2 HEART PALPITATIONS: Primary | ICD-10-CM

## 2018-09-12 DIAGNOSIS — Z82.49 FH: CAD (CORONARY ARTERY DISEASE): ICD-10-CM

## 2018-09-12 LAB
ALBUMIN SERPL-MCNC: 3.8 G/DL (ref 3.4–5)
ALP SERPL-CCNC: 101 U/L (ref 40–150)
ALT SERPL W P-5'-P-CCNC: 94 U/L (ref 0–70)
ANION GAP SERPL CALCULATED.3IONS-SCNC: 4 MMOL/L (ref 3–14)
AST SERPL W P-5'-P-CCNC: 56 U/L (ref 0–45)
BASOPHILS # BLD AUTO: 0 10E9/L (ref 0–0.2)
BASOPHILS NFR BLD AUTO: 0.5 %
BILIRUB SERPL-MCNC: 0.4 MG/DL (ref 0.2–1.3)
BUN SERPL-MCNC: 17 MG/DL (ref 7–30)
CALCIUM SERPL-MCNC: 8.5 MG/DL (ref 8.5–10.1)
CHLORIDE SERPL-SCNC: 103 MMOL/L (ref 94–109)
CHOLEST SERPL-MCNC: 217 MG/DL
CO2 SERPL-SCNC: 30 MMOL/L (ref 20–32)
CREAT SERPL-MCNC: 1.04 MG/DL (ref 0.66–1.25)
DIFFERENTIAL METHOD BLD: NORMAL
EOSINOPHIL # BLD AUTO: 0.2 10E9/L (ref 0–0.7)
EOSINOPHIL NFR BLD AUTO: 3.3 %
ERYTHROCYTE [DISTWIDTH] IN BLOOD BY AUTOMATED COUNT: 12.8 % (ref 10–15)
GFR SERPL CREATININE-BSD FRML MDRD: 76 ML/MIN/1.7M2
GLUCOSE SERPL-MCNC: 103 MG/DL (ref 70–99)
HCT VFR BLD AUTO: 43.4 % (ref 40–53)
HDLC SERPL-MCNC: 60 MG/DL
HGB BLD-MCNC: 15 G/DL (ref 13.3–17.7)
IMM GRANULOCYTES # BLD: 0 10E9/L (ref 0–0.4)
IMM GRANULOCYTES NFR BLD: 0.2 %
LDLC SERPL CALC-MCNC: 139 MG/DL
LYMPHOCYTES # BLD AUTO: 1.6 10E9/L (ref 0.8–5.3)
LYMPHOCYTES NFR BLD AUTO: 25.5 %
MCH RBC QN AUTO: 31.5 PG (ref 26.5–33)
MCHC RBC AUTO-ENTMCNC: 34.6 G/DL (ref 31.5–36.5)
MCV RBC AUTO: 91 FL (ref 78–100)
MONOCYTES # BLD AUTO: 0.4 10E9/L (ref 0–1.3)
MONOCYTES NFR BLD AUTO: 6.1 %
NEUTROPHILS # BLD AUTO: 4.1 10E9/L (ref 1.6–8.3)
NEUTROPHILS NFR BLD AUTO: 64.4 %
NONHDLC SERPL-MCNC: 157 MG/DL
NRBC # BLD AUTO: 0 10*3/UL
NRBC BLD AUTO-RTO: 0 /100
PLATELET # BLD AUTO: 228 10E9/L (ref 150–450)
POTASSIUM SERPL-SCNC: 3.9 MMOL/L (ref 3.4–5.3)
PROT SERPL-MCNC: 7.4 G/DL (ref 6.8–8.8)
RBC # BLD AUTO: 4.76 10E12/L (ref 4.4–5.9)
SODIUM SERPL-SCNC: 137 MMOL/L (ref 133–144)
TRIGL SERPL-MCNC: 91 MG/DL
TSH SERPL DL<=0.005 MIU/L-ACNC: 3.32 MU/L (ref 0.4–4)
WBC # BLD AUTO: 6.3 10E9/L (ref 4–11)

## 2018-09-12 PROCEDURE — 93000 ELECTROCARDIOGRAM COMPLETE: CPT | Performed by: INTERNAL MEDICINE

## 2018-09-12 PROCEDURE — 80061 LIPID PANEL: CPT | Performed by: FAMILY MEDICINE

## 2018-09-12 PROCEDURE — 36415 COLL VENOUS BLD VENIPUNCTURE: CPT | Performed by: FAMILY MEDICINE

## 2018-09-12 PROCEDURE — 99214 OFFICE O/P EST MOD 30 MIN: CPT | Performed by: FAMILY MEDICINE

## 2018-09-12 PROCEDURE — 80050 GENERAL HEALTH PANEL: CPT | Performed by: FAMILY MEDICINE

## 2018-09-12 ASSESSMENT — ANXIETY QUESTIONNAIRES
1. FEELING NERVOUS, ANXIOUS, OR ON EDGE: SEVERAL DAYS
7. FEELING AFRAID AS IF SOMETHING AWFUL MIGHT HAPPEN: SEVERAL DAYS
3. WORRYING TOO MUCH ABOUT DIFFERENT THINGS: SEVERAL DAYS
IF YOU CHECKED OFF ANY PROBLEMS ON THIS QUESTIONNAIRE, HOW DIFFICULT HAVE THESE PROBLEMS MADE IT FOR YOU TO DO YOUR WORK, TAKE CARE OF THINGS AT HOME, OR GET ALONG WITH OTHER PEOPLE: SOMEWHAT DIFFICULT
GAD7 TOTAL SCORE: 7
6. BECOMING EASILY ANNOYED OR IRRITABLE: SEVERAL DAYS
2. NOT BEING ABLE TO STOP OR CONTROL WORRYING: SEVERAL DAYS
5. BEING SO RESTLESS THAT IT IS HARD TO SIT STILL: SEVERAL DAYS

## 2018-09-12 ASSESSMENT — PATIENT HEALTH QUESTIONNAIRE - PHQ9: 5. POOR APPETITE OR OVEREATING: SEVERAL DAYS

## 2018-09-12 ASSESSMENT — PAIN SCALES - GENERAL: PAINLEVEL: NO PAIN (0)

## 2018-09-12 NOTE — MR AVS SNAPSHOT
"              After Visit Summary   9/12/2018    Valeriy Webb Jr    MRN: [id number]           Patient Information     Date Of Birth          1970        Visit Information        Provider Department      9/12/2018 10:30 AM Arben Basurto MD [facility] [place]        Today's Diagnoses     Heart palpitations    -  1    FH: CAD (coronary artery disease)          Care Instructions      Risk Factors for Heart Disease  A risk factor is something that increases your chance of having heart disease. Heart disease (also called coronary artery disease) involves damage to the heart arteries. These blood vessels need to work well to provide the oxygen your heart needs to pump blood to the rest of your body. Things like smoking or high cholesterol levels can damage arteries. You can t control some risk factors, such as age and a family history of heart disease. But there are many things you can control to reduce your risk for heart disease.    Unhealthy cholesterol levels  Cholesterol is a fat-like substance in your blood. It can build up along the artery walls. This is called plaque. Over time, plaque narrows the arteries and reduces blood flow to your heart or brain. If a blood clot forms or a piece of the plaque breaks off, it can completely block the artery and cause a heart attack or stroke. Your risk of heart disease goes up if you have high levels of LDL (\"bad\") cholesterol or triglycerides (another fatty substance that can build up). You re also at risk if you have low HDL cholesterol (\"good\") cholesterol. HDL helps clear the bad cholesterol away. You're at risk if you have:  HDL cholesterol 50 mg/dL or lower; LDL cholesterol 100 mg/dL; or triglycerides of 150 mg/dL or higher.  Smoking  This is the most important risk factor you can change. Smoking causes inflammation and damages the smooth muscle that lines the arteries making them less flexible. It also raises your blood pressure, causing further damage " to the artery lining. Smoking also increases your risk that your blood may clot, block an artery, and cause a heart attack or stroke. Smoking also damages your lungs, which affects the delivery of oxygen to the body. If you smoke, you are 2 to 4 times more likely to develop coronary artery disease. If you smoke, it's never too late to help your heart. Ask your doctor about nicotine replacement products and smoking cessation support.  High blood pressure  High blood pressure occurs when blood pushes too hard against artery walls. This causes damage to the artery walls and the formation of scar tissue as it heals. This makes the arteries stiff and weak. Plaque sticks to the scarred tissue narrowing and hardening the arteries. High blood pressure also causes your heart to work harder to get blood out to the body. High blood pressure raises your risk of heart attack, also known as acute myocardial infarction, or AMI, and especially stroke. The brain tissue is especially sensitive to high blood pressure damage. You're at risk if your blood pressure is 120/80 or higher.  Negative emotions  Chronic stress, pent-up anger, and other negative emotions have been linked to heart disease. This occurs because stress increases the levels of a hormone that increase the demand on your heart. Over time, these emotions could raise your heart disease risk.  Metabolic syndrome  This is caused by a combination of certain risk factors. It puts you at extra high risk of heart disease, stroke, and diabetes. You have metabolic syndrome if you have 3 or more of the following: low HDL cholesterol; high triglycerides; high blood pressure; high blood sugar; extra weight around the waist.  Diabetes  Diabetes occurs when you have high levels of sugar (glucose) in your blood. This can damage arteries if not kept under control. Having diabetes also makes you more likely to have a silent heart attack--one without any symptoms.  Excess weight  Excess  weight makes other risk factors, such as diabetes, more likely. Excess weight around the waist or stomach increases your heart disease risk the most.  Lack of physical activity  When you re not active, you re more likely to develop diabetes, high blood pressure, high cholesterol levels, and excess weight.     Most people with heart disease have more than one risk factor.   Date Last Reviewed: 3/28/2016    3502-4054 [employer]. [address] [place] [address], [place] [address]. All rights reserved. This information is not intended as a substitute for professional medical care. Always follow your healthcare professional's instructions.                Follow-ups after your visit        Who to contact     If you have questions or need follow up information about today's clinic visit or your schedule please contact [facility] directly at [phone].  Normal or non-critical lab and imaging results will be communicated to you by MyChart, letter or phone within 4 business days after the clinic has received the results. If you do not hear from us within 7 days, please contact the clinic through [employer] or phone. If you have a critical or abnormal lab result, we will notify you by phone as soon as possible.  Submit refill requests through [employer] or call your pharmacy and they will forward the refill request to us. Please allow 3 business days for your refill to be completed.          Additional Information About Your Visit        [employer] Information     [employer] gives you secure access to your electronic health record. If you see a primary care provider, you can also send messages to your care team and make appointments. If you have questions, please call your primary care clinic.  If you do not have a primary care provider, please call [phone] and they will assist you.        Care EveryWhere ID     This is your Care EveryWhere ID. This could be used by other organizations to access your [place]  "medical records  [id number]        Your Vitals Were     Pulse Temperature Respirations Height Pulse Oximetry BMI (Body Mass Index)    82 96.3  F (35.7  C) (Tympanic) 18 6' 2\" (1.88 m) 96% 37.75 kg/m2       Blood Pressure from Last 3 Encounters:   09/12/18 120/82   09/15/17 122/74   01/09/17 122/72    Weight from Last 3 Encounters:   09/12/18 294 lb (133.4 kg)   09/15/17 284 lb (128.8 kg)   01/09/17 281 lb (127.5 kg)              We Performed the Following     CBC with platelets differential     Comprehensive metabolic panel     EKG 12-lead complete w/read - Clinics     Lipid Profile     TSH        Primary Care Provider Office Phone # Fax #    Arben Basurto [phone] [phone]        Equal Access to Services     [facility] : Hadii eden blocko Sodavon, waaxda luqadaha, qaybta kaalmada adegaganyada, archana wheat . So [facility] [phone].    ATENCIÓN: Si habla español, tiene a ayala disposición servicios gratuitos de asistencia lingüística. Llame al [phone].    We comply with applicable federal civil rights laws and [place] laws. We do not discriminate on the basis of race, color, national origin, age, disability, sex, sexual orientation, or gender identity.            Thank you!     Thank you for choosing [facility]  for your care. Our goal is always to provide you with excellent care. Hearing back from our patients is one way we can continue to improve our services. Please take a few minutes to complete the written survey that you may receive in the mail after your visit with us. Thank you!             Your Updated Medication List - Protect others around you: Learn how to safely use, store and throw away your medicines at www.disposemymeds.org.          This list is accurate as of 9/12/18 12:11 PM.  Always use your most recent med list.                   Brand Name Dispense Instructions for use Diagnosis    aspirin " 81 MG chewable tablet     108 tablet    Take 1 tablet (81 mg) by mouth daily    FH: CAD (coronary artery disease)       cyclobenzaprine 10 MG tablet    FLEXERIL    90 tablet    Take 1 tablet (10 mg) by mouth 3 times daily    DDD (degenerative disc disease), lumbar       ibuprofen 800 MG tablet    ADVIL/MOTRIN    90 tablet    Take 1 tablet (800 mg) by mouth nightly as needed    Displacement of lumbar intervertebral disc without myelopathy, Chronic pain syndrome       MULTIVITAMIN PO       Fatigue, Low serum testosterone level, FH: CAD (coronary artery disease)       vitamin D3 2000 units Caps      Take 2 capsules by mouth daily.

## 2018-09-12 NOTE — NURSING NOTE
"Chief Complaint   Patient presents with     Heart Problem     palpatations       Initial /82  Pulse 82  Temp 96.3  F (35.7  C) (Tympanic)  Resp 18  Ht 6' 2\" (1.88 m)  Wt 294 lb (133.4 kg)  SpO2 96%  BMI 37.75 kg/m2 Estimated body mass index is 37.75 kg/(m^2) as calculated from the following:    Height as of this encounter: 6' 2\" (1.88 m).    Weight as of this encounter: 294 lb (133.4 kg).  Medication Reconciliation: complete    Britany Tran LPN  "

## 2018-09-12 NOTE — PATIENT INSTRUCTIONS
"  Risk Factors for Heart Disease  A risk factor is something that increases your chance of having heart disease. Heart disease (also called coronary artery disease) involves damage to the heart arteries. These blood vessels need to work well to provide the oxygen your heart needs to pump blood to the rest of your body. Things like smoking or high cholesterol levels can damage arteries. You can t control some risk factors, such as age and a family history of heart disease. But there are many things you can control to reduce your risk for heart disease.    Unhealthy cholesterol levels  Cholesterol is a fat-like substance in your blood. It can build up along the artery walls. This is called plaque. Over time, plaque narrows the arteries and reduces blood flow to your heart or brain. If a blood clot forms or a piece of the plaque breaks off, it can completely block the artery and cause a heart attack or stroke. Your risk of heart disease goes up if you have high levels of LDL (\"bad\") cholesterol or triglycerides (another fatty substance that can build up). You re also at risk if you have low HDL cholesterol (\"good\") cholesterol. HDL helps clear the bad cholesterol away. You're at risk if you have:  HDL cholesterol 50 mg/dL or lower; LDL cholesterol 100 mg/dL; or triglycerides of 150 mg/dL or higher.  Smoking  This is the most important risk factor you can change. Smoking causes inflammation and damages the smooth muscle that lines the arteries making them less flexible. It also raises your blood pressure, causing further damage to the artery lining. Smoking also increases your risk that your blood may clot, block an artery, and cause a heart attack or stroke. Smoking also damages your lungs, which affects the delivery of oxygen to the body. If you smoke, you are 2 to 4 times more likely to develop coronary artery disease. If you smoke, it's never too late to help your heart. Ask your doctor about nicotine replacement " products and smoking cessation support.  High blood pressure  High blood pressure occurs when blood pushes too hard against artery walls. This causes damage to the artery walls and the formation of scar tissue as it heals. This makes the arteries stiff and weak. Plaque sticks to the scarred tissue narrowing and hardening the arteries. High blood pressure also causes your heart to work harder to get blood out to the body. High blood pressure raises your risk of heart attack, also known as acute myocardial infarction, or AMI, and especially stroke. The brain tissue is especially sensitive to high blood pressure damage. You're at risk if your blood pressure is 120/80 or higher.  Negative emotions  Chronic stress, pent-up anger, and other negative emotions have been linked to heart disease. This occurs because stress increases the levels of a hormone that increase the demand on your heart. Over time, these emotions could raise your heart disease risk.  Metabolic syndrome  This is caused by a combination of certain risk factors. It puts you at extra high risk of heart disease, stroke, and diabetes. You have metabolic syndrome if you have 3 or more of the following: low HDL cholesterol; high triglycerides; high blood pressure; high blood sugar; extra weight around the waist.  Diabetes  Diabetes occurs when you have high levels of sugar (glucose) in your blood. This can damage arteries if not kept under control. Having diabetes also makes you more likely to have a silent heart attack one without any symptoms.  Excess weight  Excess weight makes other risk factors, such as diabetes, more likely. Excess weight around the waist or stomach increases your heart disease risk the most.  Lack of physical activity  When you re not active, you re more likely to develop diabetes, high blood pressure, high cholesterol levels, and excess weight.     Most people with heart disease have more than one risk factor.   Date Last Reviewed:  3/28/2016    1218-2349 [employer]. [address] [place] [address], [place] [address]. All rights reserved. This information is not intended as a substitute for professional medical care. Always follow your healthcare professional's instructions.

## 2018-09-13 ASSESSMENT — ANXIETY QUESTIONNAIRES: GAD7 TOTAL SCORE: 7

## 2018-09-13 ASSESSMENT — PATIENT HEALTH QUESTIONNAIRE - PHQ9: SUM OF ALL RESPONSES TO PHQ QUESTIONS 1-9: 8

## 2018-09-17 ENCOUNTER — TELEPHONE (OUTPATIENT)
Dept: FAMILY MEDICINE | Facility: OTHER | Age: 48
End: 2018-09-17

## 2018-09-17 NOTE — TELEPHONE ENCOUNTER
I did labs- all ok.  I offered a zio-patch to see what going on when feels this. I can order this- he deferred when I seen him.  Vitamins or minerals should not cause or help this. Place order for ziopatch if so wishes.

## 2018-09-17 NOTE — TELEPHONE ENCOUNTER
2:10 PM    Reason for Call: Phone Call    Description: has been having heart palpitations.  Is concerned he has vitamin or mineral deficient.  States he took a bunch of Vitamins and it seemed to help.  Is requesting labs and/or nurse advice.      Was an appointment offered for this call? No  If yes : Appointment type              Date    Preferred method for responding to this message: Telephone Call  What is your phone number ?  [phone]    If we cannot reach you directly, may we leave a detailed response at the number you provided? Yes    Can this message wait until your PCP/provider returns, if available today? Not applicable, provider is in     Arline Barker

## 2020-01-17 ENCOUNTER — OFFICE VISIT (OUTPATIENT)
Dept: FAMILY MEDICINE | Facility: OTHER | Age: 50
End: 2020-01-17
Attending: FAMILY MEDICINE
Payer: COMMERCIAL

## 2020-01-17 VITALS
HEART RATE: 85 BPM | HEIGHT: 75 IN | TEMPERATURE: 97.3 F | DIASTOLIC BLOOD PRESSURE: 82 MMHG | SYSTOLIC BLOOD PRESSURE: 138 MMHG | BODY MASS INDEX: 36.31 KG/M2 | WEIGHT: 292 LBS | OXYGEN SATURATION: 96 %

## 2020-01-17 DIAGNOSIS — R22.9 MULTIPLE SKIN NODULES: ICD-10-CM

## 2020-01-17 DIAGNOSIS — Z72.0 TOBACCO ABUSE: ICD-10-CM

## 2020-01-17 DIAGNOSIS — Z71.6 TOBACCO ABUSE COUNSELING: ICD-10-CM

## 2020-01-17 DIAGNOSIS — R53.83 FATIGUE, UNSPECIFIED TYPE: ICD-10-CM

## 2020-01-17 DIAGNOSIS — M25.50 POLYARTHRALGIA: Primary | ICD-10-CM

## 2020-01-17 LAB
ALBUMIN SERPL-MCNC: 3.9 G/DL (ref 3.4–5)
ALP SERPL-CCNC: 121 U/L (ref 40–150)
ALT SERPL W P-5'-P-CCNC: 111 U/L (ref 0–70)
ANION GAP SERPL CALCULATED.3IONS-SCNC: 3 MMOL/L (ref 3–14)
AST SERPL W P-5'-P-CCNC: 69 U/L (ref 0–45)
BASOPHILS # BLD AUTO: 0 10E9/L (ref 0–0.2)
BASOPHILS NFR BLD AUTO: 0.6 %
BILIRUB SERPL-MCNC: 0.5 MG/DL (ref 0.2–1.3)
BUN SERPL-MCNC: 12 MG/DL (ref 7–30)
CALCIUM SERPL-MCNC: 8.8 MG/DL (ref 8.5–10.1)
CHLORIDE SERPL-SCNC: 105 MMOL/L (ref 94–109)
CO2 SERPL-SCNC: 29 MMOL/L (ref 20–32)
CREAT SERPL-MCNC: 0.78 MG/DL (ref 0.66–1.25)
CRP SERPL-MCNC: 5.3 MG/L (ref 0–8)
DIFFERENTIAL METHOD BLD: NORMAL
EOSINOPHIL # BLD AUTO: 0.3 10E9/L (ref 0–0.7)
EOSINOPHIL NFR BLD AUTO: 4.4 %
ERYTHROCYTE [DISTWIDTH] IN BLOOD BY AUTOMATED COUNT: 13 % (ref 10–15)
ERYTHROCYTE [SEDIMENTATION RATE] IN BLOOD BY WESTERGREN METHOD: 9 MM/H (ref 0–15)
GFR SERPL CREATININE-BSD FRML MDRD: >90 ML/MIN/{1.73_M2}
GLUCOSE SERPL-MCNC: 105 MG/DL (ref 70–99)
HCT VFR BLD AUTO: 43.4 % (ref 40–53)
HGB BLD-MCNC: 14.7 G/DL (ref 13.3–17.7)
IMM GRANULOCYTES # BLD: 0 10E9/L (ref 0–0.4)
IMM GRANULOCYTES NFR BLD: 0.1 %
LYMPHOCYTES # BLD AUTO: 1.4 10E9/L (ref 0.8–5.3)
LYMPHOCYTES NFR BLD AUTO: 20.6 %
MCH RBC QN AUTO: 31.1 PG (ref 26.5–33)
MCHC RBC AUTO-ENTMCNC: 33.9 G/DL (ref 31.5–36.5)
MCV RBC AUTO: 92 FL (ref 78–100)
MONOCYTES # BLD AUTO: 0.4 10E9/L (ref 0–1.3)
MONOCYTES NFR BLD AUTO: 5.5 %
NEUTROPHILS # BLD AUTO: 4.7 10E9/L (ref 1.6–8.3)
NEUTROPHILS NFR BLD AUTO: 68.8 %
NRBC # BLD AUTO: 0 10*3/UL
NRBC BLD AUTO-RTO: 0 /100
PLATELET # BLD AUTO: 227 10E9/L (ref 150–450)
POTASSIUM SERPL-SCNC: 4.4 MMOL/L (ref 3.4–5.3)
PROT SERPL-MCNC: 7.8 G/DL (ref 6.8–8.8)
RBC # BLD AUTO: 4.73 10E12/L (ref 4.4–5.9)
SODIUM SERPL-SCNC: 137 MMOL/L (ref 133–144)
WBC # BLD AUTO: 6.9 10E9/L (ref 4–11)

## 2020-01-17 PROCEDURE — 86038 ANTINUCLEAR ANTIBODIES: CPT | Performed by: FAMILY MEDICINE

## 2020-01-17 PROCEDURE — 86039 ANTINUCLEAR ANTIBODIES (ANA): CPT | Performed by: FAMILY MEDICINE

## 2020-01-17 PROCEDURE — 87207 SMEAR SPECIAL STAIN: CPT | Performed by: FAMILY MEDICINE

## 2020-01-17 PROCEDURE — 36415 COLL VENOUS BLD VENIPUNCTURE: CPT | Performed by: FAMILY MEDICINE

## 2020-01-17 PROCEDURE — 85025 COMPLETE CBC W/AUTO DIFF WBC: CPT | Performed by: FAMILY MEDICINE

## 2020-01-17 PROCEDURE — 99000 SPECIMEN HANDLING OFFICE-LAB: CPT | Performed by: FAMILY MEDICINE

## 2020-01-17 PROCEDURE — 87015 SPECIMEN INFECT AGNT CONCNTJ: CPT | Performed by: FAMILY MEDICINE

## 2020-01-17 PROCEDURE — 80053 COMPREHEN METABOLIC PANEL: CPT | Performed by: FAMILY MEDICINE

## 2020-01-17 PROCEDURE — 86431 RHEUMATOID FACTOR QUANT: CPT | Performed by: FAMILY MEDICINE

## 2020-01-17 PROCEDURE — 86666 EHRLICHIA ANTIBODY: CPT | Mod: ZL

## 2020-01-17 PROCEDURE — 99214 OFFICE O/P EST MOD 30 MIN: CPT | Performed by: FAMILY MEDICINE

## 2020-01-17 PROCEDURE — 86200 CCP ANTIBODY: CPT | Performed by: FAMILY MEDICINE

## 2020-01-17 PROCEDURE — 86140 C-REACTIVE PROTEIN: CPT | Performed by: FAMILY MEDICINE

## 2020-01-17 PROCEDURE — 85652 RBC SED RATE AUTOMATED: CPT | Performed by: FAMILY MEDICINE

## 2020-01-17 PROCEDURE — 81374 HLA I TYPING 1 ANTIGEN LR: CPT | Performed by: FAMILY MEDICINE

## 2020-01-17 PROCEDURE — 86618 LYME DISEASE ANTIBODY: CPT | Performed by: FAMILY MEDICINE

## 2020-01-17 ASSESSMENT — ANXIETY QUESTIONNAIRES
4. TROUBLE RELAXING: MORE THAN HALF THE DAYS
1. FEELING NERVOUS, ANXIOUS, OR ON EDGE: SEVERAL DAYS
5. BEING SO RESTLESS THAT IT IS HARD TO SIT STILL: NOT AT ALL
6. BECOMING EASILY ANNOYED OR IRRITABLE: SEVERAL DAYS
7. FEELING AFRAID AS IF SOMETHING AWFUL MIGHT HAPPEN: SEVERAL DAYS
2. NOT BEING ABLE TO STOP OR CONTROL WORRYING: NOT AT ALL
3. WORRYING TOO MUCH ABOUT DIFFERENT THINGS: NEARLY EVERY DAY
GAD7 TOTAL SCORE: 8
IF YOU CHECKED OFF ANY PROBLEMS ON THIS QUESTIONNAIRE, HOW DIFFICULT HAVE THESE PROBLEMS MADE IT FOR YOU TO DO YOUR WORK, TAKE CARE OF THINGS AT HOME, OR GET ALONG WITH OTHER PEOPLE: VERY DIFFICULT

## 2020-01-17 ASSESSMENT — PATIENT HEALTH QUESTIONNAIRE - PHQ9: SUM OF ALL RESPONSES TO PHQ QUESTIONS 1-9: 11

## 2020-01-17 ASSESSMENT — MIFFLIN-ST. JEOR: SCORE: 2272.19

## 2020-01-17 ASSESSMENT — PAIN SCALES - GENERAL: PAINLEVEL: MODERATE PAIN (5)

## 2020-01-17 NOTE — PATIENT INSTRUCTIONS

## 2020-01-17 NOTE — NURSING NOTE
"No chief complaint on file.      Initial BP (!) 148/80   Pulse 85   Temp 97.3  F (36.3  C)   Ht 1.892 m (6' 2.5\")   Wt 132.5 kg (292 lb)   SpO2 96%   BMI 36.99 kg/m   Estimated body mass index is 36.99 kg/m  as calculated from the following:    Height as of this encounter: 1.892 m (6' 2.5\").    Weight as of this encounter: 132.5 kg (292 lb).  Medication Reconciliation: complete  Grayson Velez LPN    "

## 2020-01-17 NOTE — PROGRESS NOTES
Subjective     Valeriy Webb Jr is a 49 year old male who presents to clinic today for the following health issues:    HPI   Musculoskeletal problem/pain      Duration: 1 year    Description  Location: joint pain and rashes    Intensity:  moderate    Accompanying signs and symptoms: warmth, swelling and redness, fatigue    History  Previous similar problem: no   Previous evaluation:  none    Precipitating or alleviating factors:  Trauma or overuse: no   Aggravating factors include: none    Therapies tried and outcome: nothing    H/o multiple tick bites last year     H/o polyarthralgias     Cyst      Duration: months    Description (location/character/radiation): right wrist and bicep and under arms    Intensity:  moderate    Accompanying signs and symptoms: ones under armpits come and go     History (similar episodes/previous evaluation): None    Precipitating or alleviating factors: None    Therapies tried and outcome: None    Deep nodules - worse in arms bilaterally over bicep    At times painfull    No redness with them    None on legs or shins    Has had for several years for sure    Does sound like gets suppurlative hydradenitis  In arm pits - this is different form other arm lesions     No Rheum issue in family    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       cyclobenzaprine (FLEXERIL) 10 MG tablet Take 1 tablet (10 mg) by mouth 3 times daily 90 tablet 2     ibuprofen (ADVIL/MOTRIN) 800 MG tablet Take 1 tablet (800 mg) by mouth nightly as needed 90 tablet 2     Multiple Vitamins-Minerals (MULTIVITAMIN OR)        Allergies   Allergen Reactions     Morphine [Morphine Sulfate] Shortness Of Breath         Reviewed and updated as  "needed this visit by Provider         Review of Systems   ROS COMP: Constitutional, HEENT, cardiovascular, pulmonary, gi and gu systems are negative, except as otherwise noted.      Objective    BP (!) 148/80   Pulse 85   Temp 97.3  F (36.3  C)   Ht 1.892 m (6' 2.5\")   Wt 132.5 kg (292 lb)   SpO2 96%   BMI 36.99 kg/m    Body mass index is 36.99 kg/m .  Physical Exam   GENERAL: healthy, alert and no distress  NECK: no adenopathy, no asymmetry, masses, or scars and thyroid normal to palpation  RESP: lungs clear to auscultation - no rales, rhonchi or wheezes  CV: regular rate and rhythm, normal S1 S2, no S3 or S4, no murmur, click or rub, no peripheral edema and peripheral pulses strong  ABDOMEN: soft, nontender, no hepatosplenomegaly, no masses and bowel sounds normal  Back - chronic pain and stiffness  MS: no gross musculoskeletal defects noted, no edema-- normal looking joints.   SKIN: no suspicious lesions or rashes-- has firm nodules under skin - many - mainly in both arms and bicep region and some on forearms.  Feel like inclusion cyst but may be little firmer than the avg cyst   Lymph- no cervical/axillary/supraclvicular/Inguinal  adenopathy           Assessment & Plan       ICD-10-CM    1. Polyarthralgia M25.50 Comprehensive metabolic panel     CBC with platelets differential     Erythrocyte sedimentation rate auto     CRP inflammation     Lyme Disease Yumi with reflex to WB Serum     Cyclic Citrullinated Peptide Antibody IgG     Parasite stain     Anaplasma  phagocytoph antibody IgM     Rheumatoid factor     Anti Nuclear Yumi IgG by IFA with Reflex     HLA-B27 Typing     CANCELED: DELFINO TITER: Laboratory Miscellaneous Order   2. Tobacco abuse Z72.0 Tobacco Cessation - Order to Satisfy Health Maintenance   3. Tobacco abuse counseling Z71.6    4. Multiple skin nodules R22.9 Comprehensive metabolic panel     CBC with platelets differential     Erythrocyte sedimentation rate auto     CRP inflammation     Lyme " "Disease Yumi with reflex to WB Serum     Cyclic Citrullinated Peptide Antibody IgG     Parasite stain     Anaplasma  phagocytoph antibody IgM     Rheumatoid factor     Anti Nuclear Yumi IgG by IFA with Reflex     HLA-B27 Typing     CANCELED: DELFINO TITER: Laboratory Miscellaneous Order   5. Fatigue, unspecified type R53.83 HLA-B27 Typing   Etiology of above issues unknown.  Does not feel or look like Erythema Nodosum . Will check above labs and call with results. IF gets flares of above physical findings- should be seen then. Symptomatic treatment was discussed along when patient should call and/or come back into the clinic or go to ER/Urgent care. All questions answered.   Will call with results.  Consider bx deep nodules is option. Consider psoriasis as possible cause.  Consider seeing Rheum.  Discussed in length conservative measures of OTC medications for pain, Ice/Heat, elevation and the concept of R.I.C.E.. Continue behavioral changes and proper body mechanics to allow for healing. Follow up as directed.   Symptomatic treatment was discussed along when patient should call and/or come back into the clinic or go to ER/Urgent care. All questions answered.        Tobacco Cessation:   reports that he has never smoked. His smokeless tobacco use includes chew.  Tobacco Cessation Action Plan: Information offered: Patient not interested at this time      BMI:   Estimated body mass index is 36.99 kg/m  as calculated from the following:    Height as of this encounter: 1.892 m (6' 2.5\").    Weight as of this encounter: 132.5 kg (292 lb).               No follow-ups on file.    Arben Basurto MD  [facility] - [place]      "

## 2020-01-18 ASSESSMENT — ANXIETY QUESTIONNAIRES: GAD7 TOTAL SCORE: 8

## 2020-01-20 LAB
A PHAGOCYTOPH IGM TITR SER IF: NORMAL {TITER}
ANA PAT SER IF-IMP: ABNORMAL
ANA SER QL IF: ABNORMAL
ANA TITR SER IF: ABNORMAL {TITER}
B BURGDOR IGG+IGM SER QL: 0.07 (ref 0–0.89)
CCP AB SER IA-ACNC: 2 U/ML
PARASITE SPEC INSPECT: NORMAL
RHEUMATOID FACT SER NEPH-ACNC: <20 IU/ML (ref 0–20)
SPECIMEN SOURCE: NORMAL

## 2020-01-21 LAB
B LOCUS: NORMAL
B27TEST METHOD: NORMAL

## 2020-01-22 ENCOUNTER — TELEPHONE (OUTPATIENT)
Dept: FAMILY MEDICINE | Facility: OTHER | Age: 50
End: 2020-01-22

## 2020-01-22 DIAGNOSIS — Z15.89 HLA B27 (HLA B27 POSITIVE): Primary | ICD-10-CM

## 2020-01-22 DIAGNOSIS — M25.50 POLYARTHRALGIA: ICD-10-CM

## 2020-01-22 DIAGNOSIS — R21 RASH AND NONSPECIFIC SKIN ERUPTION: ICD-10-CM

## 2020-02-20 ENCOUNTER — OFFICE VISIT (OUTPATIENT)
Dept: FAMILY MEDICINE | Facility: OTHER | Age: 50
End: 2020-02-20
Attending: FAMILY MEDICINE
Payer: COMMERCIAL

## 2020-02-20 VITALS
HEART RATE: 75 BPM | OXYGEN SATURATION: 98 % | DIASTOLIC BLOOD PRESSURE: 76 MMHG | SYSTOLIC BLOOD PRESSURE: 122 MMHG | WEIGHT: 286.6 LBS | TEMPERATURE: 97.2 F | BODY MASS INDEX: 36.3 KG/M2 | RESPIRATION RATE: 18 BRPM

## 2020-02-20 DIAGNOSIS — M67.431 GANGLION CYST OF WRIST, RIGHT: Primary | ICD-10-CM

## 2020-02-20 DIAGNOSIS — Z15.89 HLA B27 (HLA B27 POSITIVE): ICD-10-CM

## 2020-02-20 DIAGNOSIS — M13.0 POLYARTHRITIS: ICD-10-CM

## 2020-02-20 PROCEDURE — 99213 OFFICE O/P EST LOW 20 MIN: CPT | Performed by: FAMILY MEDICINE

## 2020-02-20 ASSESSMENT — PAIN SCALES - GENERAL: PAINLEVEL: MODERATE PAIN (5)

## 2020-02-20 NOTE — NURSING NOTE
"Chief Complaint   Patient presents with     RECHECK       Initial /76 (BP Location: Right arm, Patient Position: Sitting, Cuff Size: Adult Large)   Pulse 75   Temp 97.2  F (36.2  C) (Tympanic)   Resp 18   Wt 130 kg (286 lb 9.6 oz)   SpO2 98%   BMI 36.30 kg/m   Estimated body mass index is 36.3 kg/m  as calculated from the following:    Height as of 1/17/20: 1.892 m (6' 2.5\").    Weight as of this encounter: 130 kg (286 lb 9.6 oz).  Medication Reconciliation: complete  Rodger Horn LPN  "

## 2020-02-20 NOTE — PROGRESS NOTES
"Subjective     Valeriy Webb Jr is a 49 year old male who presents to clinic today for the following health issues:    HPI   Musculoskeletal problem/pain      Duration: 1 year    Description  Location: Joints     Intensity:  moderate    Accompanying signs and symptoms: warmth, swelling and redness    History  Previous similar problem: no   Previous evaluation:  none    Precipitating or alleviating factors:  Trauma or overuse: no   Aggravating factors include: repetitious exercise    Therapies tried and outcome: ice and heating pad with no relief    Polyarthralgias - different joints affected at different times (elbow, knee, hips, hands, back)    Typically symptoms do not last long, but elbow has lasted for months before    Negative CRP and ESR in office month ago - no symptoms at that time    Seeing rheumatology in [place] in march 9, 2020    Cyst    Bump on right wrist has gotten bigger since last visit on 01/17/2020.      Duration: months    Description (location/character/radiation): right wrist, both biceps, left forearm, back, stomach    Intensity:  moderate    Accompanying signs and symptoms: None    History (similar episodes/previous evaluation): None    Precipitating or alleviating factors: None    Therapies tried and outcome: None    Right wrist cyst Doubled in size since last seen (been there for about 2 months) - roughly an inch long    Left bicep cysts changing - \"feels like some coming together\"    Right bicep cysts - feels like they are actually going away    Noticed that they catch when moving arms - \"feels like they are on the nerves\"    Started working out and eating \"just meat and water\" - has been losing weight       Current Outpatient Medications   Medication Sig Dispense Refill     Cholecalciferol (VITAMIN D3) 2000 UNIT CAPS Take 2 capsules by mouth daily.       Multiple Vitamins-Minerals (MULTIVITAMIN OR)        aspirin 81 MG chewable tablet Take 1 tablet (81 mg) by mouth daily 108 tablet 3 "     cyclobenzaprine (FLEXERIL) 10 MG tablet Take 1 tablet (10 mg) by mouth 3 times daily (Patient not taking: Reported on 2/20/2020) 90 tablet 2     ibuprofen (ADVIL/MOTRIN) 800 MG tablet Take 1 tablet (800 mg) by mouth nightly as needed (Patient not taking: Reported on 2/20/2020) 90 tablet 2     Allergies   Allergen Reactions     Morphine [Morphine Sulfate] Shortness Of Breath         Reviewed and updated as needed this visit by Provider         Review of Systems   ROS COMP: Constitutional, HEENT, cardiovascular, pulmonary, gi and gu systems are negative, except as otherwise noted.      Objective    /76 (BP Location: Right arm, Patient Position: Sitting, Cuff Size: Adult Large)   Pulse 75   Temp 97.2  F (36.2  C) (Tympanic)   Resp 18   Wt 130 kg (286 lb 9.6 oz)   SpO2 98%   BMI 36.30 kg/m    Body mass index is 36.3 kg/m .  Physical Exam   GENERAL: healthy, alert and no distress  NECK: no adenopathy, no asymmetry, masses, or scars and thyroid normal to palpation  RESP: lungs clear to auscultation - no rales, rhonchi or wheezes  CV: regular rate and rhythm, normal S1 S2, no S3 or S4, no murmur, click or rub, no peripheral edema and peripheral pulses strong  MS: no gross musculoskeletal defects noted, no edema  SKIN: no suspicious lesions or rashes but has cystic lesion 1.5cm over distal right ulna region.   Other deep lesions/ nodules in bicep region - not larger or flared         previous labs reviewed    Assessment & Plan     1. Ganglion cyst of wrist, right  Discussed / benign - different from deeper nodules of both arms -    2. HLA B27 (HLA B27 positive)  Discussed - may?? Be part of his poly arthritis - has Rheum appt in march     3. 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  See what Rheum says in March.         BMI:   Estimated body mass index is 36.3 kg/m  as calculated from the following:    Height as of 1/17/20: 1.892 m  "(6' 2.5\").    Weight as of this encounter: 130 kg (286 lb 9.6 oz).               No follow-ups on file.    Arben Basurto MD  [facility] - [place]      "

## 2020-03-01 ENCOUNTER — HEALTH MAINTENANCE LETTER (OUTPATIENT)
Age: 50
End: 2020-03-01

## 2020-12-14 ENCOUNTER — HEALTH MAINTENANCE LETTER (OUTPATIENT)
Age: 50
End: 2020-12-14

## 2021-04-17 ENCOUNTER — HEALTH MAINTENANCE LETTER (OUTPATIENT)
Age: 51
End: 2021-04-17

## 2021-10-02 ENCOUNTER — HEALTH MAINTENANCE LETTER (OUTPATIENT)
Age: 51
End: 2021-10-02

## 2022-05-14 ENCOUNTER — HEALTH MAINTENANCE LETTER (OUTPATIENT)
Age: 52
End: 2022-05-14

## 2022-09-03 ENCOUNTER — HEALTH MAINTENANCE LETTER (OUTPATIENT)
Age: 52
End: 2022-09-03

## 2023-06-02 ENCOUNTER — HEALTH MAINTENANCE LETTER (OUTPATIENT)
Age: 53
End: 2023-06-02

## 2023-06-28 ENCOUNTER — OFFICE VISIT (OUTPATIENT)
Dept: FAMILY MEDICINE | Facility: OTHER | Age: 53
End: 2023-06-28
Attending: FAMILY MEDICINE
Payer: OTHER MISCELLANEOUS

## 2023-06-28 ENCOUNTER — NURSE TRIAGE (OUTPATIENT)
Dept: FAMILY MEDICINE | Facility: OTHER | Age: 53
End: 2023-06-28

## 2023-06-28 VITALS
OXYGEN SATURATION: 96 % | WEIGHT: 301 LBS | HEIGHT: 75 IN | HEART RATE: 81 BPM | SYSTOLIC BLOOD PRESSURE: 161 MMHG | BODY MASS INDEX: 37.42 KG/M2 | RESPIRATION RATE: 22 BRPM | DIASTOLIC BLOOD PRESSURE: 84 MMHG | TEMPERATURE: 97.7 F

## 2023-06-28 DIAGNOSIS — G89.29 ACUTE EXACERBATION OF CHRONIC LOW BACK PAIN: Primary | ICD-10-CM

## 2023-06-28 DIAGNOSIS — M54.50 ACUTE EXACERBATION OF CHRONIC LOW BACK PAIN: Primary | ICD-10-CM

## 2023-06-28 PROCEDURE — 99203 OFFICE O/P NEW LOW 30 MIN: CPT | Performed by: FAMILY MEDICINE

## 2023-06-28 RX ORDER — PREDNISONE 20 MG/1
60 TABLET ORAL DAILY
Qty: 15 TABLET | Refills: 0 | Status: SHIPPED | OUTPATIENT
Start: 2023-06-28 | End: 2023-07-03

## 2023-06-28 RX ORDER — METHOCARBAMOL 750 MG/1
1500 TABLET, FILM COATED ORAL 3 TIMES DAILY
Qty: 60 TABLET | Refills: 0 | Status: SHIPPED | OUTPATIENT
Start: 2023-06-28

## 2023-06-28 ASSESSMENT — PATIENT HEALTH QUESTIONNAIRE - PHQ9
5. POOR APPETITE OR OVEREATING: NEARLY EVERY DAY
SUM OF ALL RESPONSES TO PHQ QUESTIONS 1-9: 12

## 2023-06-28 ASSESSMENT — ANXIETY QUESTIONNAIRES
3. WORRYING TOO MUCH ABOUT DIFFERENT THINGS: NOT AT ALL
GAD7 TOTAL SCORE: 12
2. NOT BEING ABLE TO STOP OR CONTROL WORRYING: NOT AT ALL
1. FEELING NERVOUS, ANXIOUS, OR ON EDGE: NOT AT ALL
GAD7 TOTAL SCORE: 12
5. BEING SO RESTLESS THAT IT IS HARD TO SIT STILL: NEARLY EVERY DAY
IF YOU CHECKED OFF ANY PROBLEMS ON THIS QUESTIONNAIRE, HOW DIFFICULT HAVE THESE PROBLEMS MADE IT FOR YOU TO DO YOUR WORK, TAKE CARE OF THINGS AT HOME, OR GET ALONG WITH OTHER PEOPLE: VERY DIFFICULT
7. FEELING AFRAID AS IF SOMETHING AWFUL MIGHT HAPPEN: NEARLY EVERY DAY
6. BECOMING EASILY ANNOYED OR IRRITABLE: NEARLY EVERY DAY

## 2023-06-28 ASSESSMENT — PAIN SCALES - GENERAL: PAINLEVEL: WORST PAIN (10)

## 2023-06-28 NOTE — PROGRESS NOTES
Occupational Visit     SUBJECTIVE:  Valeriy Webb Jr, 53 year old, male is seen for follow up of occupational injury. Date of injury is 10/06/2008    Linked Episodes   Type: Episode: Status: Noted: Resolved: Last update: Updated by:   WORK COMP [place] Active 5/12/2015 6/28/2023 12:45 PM Grayson Velez LPN      Comments:10/06/2008       Distant back injury and surgery, occasional bouts, usually managed at home, but this bout he can't take the pain.    This flare started about 6 days ago.  Right lateral iliac crest area.  Also pain down BLE is worse than usual.  Feels like he reherniated a disc, no known injury or overuse.  Just woke with it.  Motrin helps but not fully.  Prednisone has helped in the past.          Back Pain       Duration: 10/06/2008        Specific cause: lifting    Description:   Location of pain: low back both  Character of pain: sharp, dull ache, stabbing, gnawing and burning  Pain radiation:radiates into the right buttocks, radiates into the right leg and radiates into the right foot  New numbness or weakness in legs, not attributed to pain:  YES    Intensity: severe    History:   Pain interferes with job: YES  History of back problems: previous herniated disc  Any previous MRI or X-rays: None: had imaging but doesn't remember when the last one was, a long time ago  Sees a specialist for back pain:  No  Therapies tried without relief: cold, heat, NSAIDs and stretch    Alleviating factors:   Improved by: none      Precipitating factors:  Worsened by: Lifting, Bending, Standing, Sitting, Lying Flat, Walking and Coughing          Allergies   Allergen Reactions     Morphine [Morphine Sulfate] Shortness Of Breath         Review of Systems:  No fever, chills, abd pain, saddle anesthesia, bowel/bladder changes, weakness.      OBJECTIVE:  Vitals:    06/28/23 1258   BP: (!) 161/84   Pulse: 81   Resp: 22   Temp: 97.7  F (36.5  C)   SpO2: 96%               Exam:  AAO NAD  RRR without  murmur  CTAB  Abd s/nt/nd/+BS  +SLR right  No midline back tenderness      ASSESSMENT/PLAN:  Acute exacerbation of chronic low back pain    Try prednisone burst as he reports this has help in the past.  Also Rx Robaxin, fatigue precautions discussed.    Follow-up as needed.

## 2023-06-28 NOTE — TELEPHONE ENCOUNTER
"Patient would like an overbook today if possible    Reason for Disposition    Patient wants to be seen    Answer Assessment - Initial Assessment Questions  1. ONSET: \"When did the pain begin?\"       One week ago  2. LOCATION: \"Where does it hurt?\" (upper, mid or lower back)      lower  3. SEVERITY: \"How bad is the pain?\"  (e.g., Scale 1-10; mild, moderate, or severe)    - MILD (1-3): doesn't interfere with normal activities     - MODERATE (4-7): interferes with normal activities or awakens from sleep     - SEVERE (8-10): excruciating pain, unable to do any normal activities       10 plus  4. PATTERN: \"Is the pain constant?\" (e.g., yes, no; constant, intermittent)       constant  5. RADIATION: \"Does the pain shoot into your legs or elsewhere?\"      Radiates into his legs  6. CAUSE:  \"What do you think is causing the back pain?\"       Had back surgery in 2008 and struggles with back pain ever since but this past week it is over the top  7. BACK OVERUSE:  \"Any recent lifting of heavy objects, strenuous work or exercise?\"      no  8. MEDICATIONS: \"What have you taken so far for the pain?\" (e.g., nothing, acetaminophen, NSAIDS)      ibuprofen  9. NEUROLOGIC SYMPTOMS: \"Do you have any weakness, numbness, or problems with bowel/bladder control?\"      Numbness in legs from the back surgery in 2008 and weakness has intensified  10. OTHER SYMPTOMS: \"Do you have any other symptoms?\" (e.g., fever, abdominal pain, burning with urination, blood in urine)        Burning when urinates sometimes  11. PREGNANCY: \"Is there any chance you are pregnant?\" (e.g., yes, no; LMP)        no    Protocols used: BACK PAIN-A-OH      "

## 2023-07-05 ENCOUNTER — TELEPHONE (OUTPATIENT)
Dept: FAMILY MEDICINE | Facility: OTHER | Age: 53
End: 2023-07-05